# Patient Record
Sex: MALE | Employment: FULL TIME | URBAN - METROPOLITAN AREA
[De-identification: names, ages, dates, MRNs, and addresses within clinical notes are randomized per-mention and may not be internally consistent; named-entity substitution may affect disease eponyms.]

---

## 2017-06-01 PROBLEM — M76.61 TENDONITIS, ACHILLES, RIGHT: Status: ACTIVE | Noted: 2017-06-01

## 2017-06-01 PROBLEM — S86.311D PERONEAL TENDON TEAR, RIGHT, SUBSEQUENT ENCOUNTER: Status: ACTIVE | Noted: 2017-06-01

## 2018-08-06 PROBLEM — M75.101 ROTATOR CUFF SYNDROME OF RIGHT SHOULDER: Status: ACTIVE | Noted: 2018-08-06

## 2018-08-09 PROBLEM — M25.512 ACUTE PAIN OF LEFT SHOULDER: Status: ACTIVE | Noted: 2018-08-09

## 2022-09-27 PROBLEM — F51.04 PSYCHOPHYSIOLOGICAL INSOMNIA: Status: ACTIVE | Noted: 2022-09-27

## 2022-10-21 ENCOUNTER — TELEPHONE (OUTPATIENT)
Dept: PULMONOLOGY | Facility: CLINIC | Age: 52
End: 2022-10-21

## 2022-10-21 NOTE — TELEPHONE ENCOUNTER
Spoke to pharmacy staff regarding zolpidem prescription. Patient picked up 15 tabs on 9/27/22 for $0.14 patient picked up additional 5 tabs yesterday and paid $10.  Insurance will cover Zolpidem 10 mg-15 tabs every 25 days for $.14     Spoke to patient who states no issues with paying the $10 for the additional 10 tabs. Nothing further needed from [facility].

## 2022-11-06 ENCOUNTER — PATIENT MESSAGE (OUTPATIENT)
Dept: PULMONOLOGY | Facility: CLINIC | Age: 52
End: 2022-11-06

## 2022-11-08 ENCOUNTER — TELEPHONE (OUTPATIENT)
Dept: PULMONOLOGY | Facility: CLINIC | Age: 52
End: 2022-11-08

## 2022-11-08 NOTE — TELEPHONE ENCOUNTER
Dr. Broderick Barnard, please see patient message below. ----- Message from Quintin Morfin sent at 11/6/2022  7:17 PM CST -----  Regarding: Follow-up from visit  Hello Dr Broderick Barnard - I wanted to follow-up from our visit at the end of September. I have been taking the Zolpidem 10mg most evenings to help with my sleep. It helps most nights for me to get 5 hours of sleep, but once I wake up, I can't go back. And there have been some nights  where I wake up after two hours and cannot go back to sleep. I have started taking Venlaflaxine in the morning (75mg) to help with anxiety I have been experiencing as of late. I take that in the morning and the Zolpidem at night. You had said I should follow-up with you, so I wanted to see if you think I should be doing anything differently. Thanks so much.   [facility] [employer]

## 2022-11-08 NOTE — TELEPHONE ENCOUNTER
From: Lupis Mcbride  To: Chapis Contreras MD  Sent: 11/6/2022 7:17 PM CST  Subject: Follow-up from visit    Hello Dr Tracie Hwang - I wanted to follow-up from our visit at the end of September. I have been taking the Zolpidem 10mg most evenings to help with my sleep. It helps most nights for me to get 5 hours of sleep, but once I wake up, I can't go back. And there have been some nights where I wake up after two hours and cannot go back to sleep. I have started taking Venlaflaxine in the morning (75mg) to help with anxiety I have been experiencing as of late. I take that in the morning and the Zolpidem at night. You had said I should follow-up with you, so I wanted to see if you think I should be doing anything differently. Thanks so much.  [employer]

## 2022-11-09 NOTE — TELEPHONE ENCOUNTER
Left message on patient's voicemail that [facility] office will send Dr. Enrique Hawthorne response via [address] [employer] message sent.

## 2022-11-13 ENCOUNTER — PATIENT MESSAGE (OUTPATIENT)
Dept: PULMONOLOGY | Facility: CLINIC | Age: 52
End: 2022-11-13